# Patient Record
(demographics unavailable — no encounter records)

---

## 2024-10-24 NOTE — PROCEDURE
[Trigger point 1-2 muscle groups] : trigger point 1-2 muscle groups [Left] : of the left [Cervical paraspinal muscle] : cervical paraspinal muscle [Pain] : pain [Alcohol] : alcohol [Betadine] : betadine [Ethyl Chloride sprayed topically] : ethyl chloride sprayed topically [___ cc    1%] : Lidocaine ~Vcc of 1%  [___ cc    0.25%] : Bupivacaine (Marcaine) ~Vcc of 0.25%  [___ cc    10mg] : Triamcinolone (Kenalog) ~Vcc of 10 mg  [Risks, benefits, alternatives discussed / Verbal consent obtained] : the risks benefits, and alternatives have been discussed, and verbal consent was obtained [FreeTextEntry3] : the pain was 8 prior to the incident and 4 after

## 2024-10-24 NOTE — HISTORY OF PRESENT ILLNESS
[9] : 9 [3] : 3 [Burning] : burning [Constant] : constant [Household chores] : household chores [Leisure] : leisure [Work] : work [Sleep] : sleep [Meds] : meds [Heat] : heat [Part time] : Work status: part time [de-identified] : 10/24/24:  82 yo RHD F with severe pain in the back of her neck since yesterday. Pain in the neck and into the left shoulder - not down the arm - NC from the urgent care   Patient was taken to the hospital last week by ambulance because she was off balance but states no injury - was found to have elevated blood pressure - had many test done and they didnt find anything else  works part time in the school  She was [facility] a [facility] in the [facility] last month  Tried tramadol  xrays today: C spine - severe spondylosis   Has memory issues  HTN hld [] : no [FreeTextEntry1] : Neck [de-identified] : turning her head [de-identified] : [facility]

## 2024-10-29 NOTE — PLAN
[FreeTextEntry1] : Routine blood work drawn in office and urine collected today. Renew meds. Will consider restart statin pending blood work results. Advised to make follow up apt with Neurology in Feb 2025. Pt advised to sign up for [facility] portal to review labs and communicate any questions or concerns directly. Yearly physical and return as needed for illness, medication refills, and new or existing complaints.

## 2024-10-29 NOTE — HEALTH RISK ASSESSMENT
[Good] : ~his/her~  mood as  good [No] : In the past 12 months have you used drugs other than those required for medical reasons? No [One fall no injury in past year] : Patient reported one fall in the past year without injury [0] : 2) Feeling down, depressed, or hopeless: Not at all (0) [PHQ-2 Negative - No further assessment needed] : PHQ-2 Negative - No further assessment needed [Never] : Never [NO] : No [Patient reported mammogram was normal] : Patient reported mammogram was normal [Patient reported colonoscopy was normal] : Patient reported colonoscopy was normal [Change in mental status noted] : Change in mental status noted [Learning/Retaining New Information] : difficulty learning/retaining new information [None] : None [With Family] : lives with family [Employed] : employed [] :  [# Of Children ___] : has [unfilled] children [Fully functional (bathing, dressing, toileting, transferring, walking, feeding)] : Fully functional (bathing, dressing, toileting, transferring, walking, feeding) [Fully functional (using the telephone, shopping, preparing meals, housekeeping, doing laundry, using] : Fully functional and needs no help or supervision to perform IADLs (using the telephone, shopping, preparing meals, housekeeping, doing laundry, using transportation, managing medications and managing finances) [Reports normal functional visual acuity (ie: able to read med bottle)] : Reports normal functional visual acuity [Patient/Caregiver not ready to engage] : , patient/caregiver not ready to engage [Audit-CScore] : 0 [JOR1Ruziy] : 0 [Language] : denies difficulty with language [Behavior] : denies difficulty with behavior [Handling Complex Tasks] : denies difficulty handling complex tasks [Reasoning] : denies difficulty with reasoning [Spatial Ability and Orientation] : denies difficulty with spatial ability and orientation [Reports changes in hearing] : Reports no changes in hearing [Reports changes in vision] : Reports no changes in vision [AdvancecareDate] : 10/24

## 2024-11-20 NOTE — HISTORY OF PRESENT ILLNESS
[FreeTextEntry1] : 11/19/2024: SANTIAGO HUTSON is a 83 year-old woman presenting for a NPV for a history of chronic neck pain.    The patient states that average pain over the past week was /10 in severity. Mood: Sleep:   Prior treatment:

## 2024-11-20 NOTE — DISCUSSION/SUMMARY
[de-identified] : SANTIAGO HUTSON is a 83 year-old woman presenting for a NPV for a history of chronic neck pain.

## 2024-12-07 NOTE — IMAGING
[de-identified] : PE neck: +tenderness to B/L cervical paraspinals, no midline tenderness, limited ROM of neck, motor and sensory intact BUE.

## 2024-12-07 NOTE — HISTORY OF PRESENT ILLNESS
[10] : 10 [5] : 5 [Radiating] : radiating [Sleep] : sleep [de-identified] : 85 y/o F with atraumatic neck pain x 2 days. She was given a MDP/TPI about a month ago with Dr. Yee with relief. denies numbness/tingling of UE/LE. denies bladder or bowel complaints.  She didn't go to pain management appt. [] : no [FreeTextEntry1] : neck [FreeTextEntry3] : 12/5/2024 [FreeTextEntry6] : discomfort  [FreeTextEntry5] : no known injury,  [FreeTextEntry7] : from her shoulder up to her neck  [de-identified] : certain movement  [de-identified] : 10/24/2024 [de-identified] : Nakia

## 2024-12-07 NOTE — ASSESSMENT
[FreeTextEntry1] : A/P cervical paraspinal spasm/DDD - medrol dose tony - gabapentin - f/u pain management

## 2024-12-31 NOTE — ASSESSMENT
[FreeTextEntry1] : XR B hand shows CMC arthritis She has signs/symptoms to R hand cellulitis over 3rd MCP region Will rx augmentin  recheck 1 week instructed on signs of worsening infection and need for ER visit if these were to occur all questions answered

## 2024-12-31 NOTE — HISTORY OF PRESENT ILLNESS
[8] : 8 [0] : 0 [Dull/Aching] : dull/aching [Localized] : localized [Intermittent] : intermittent [Retired] : Work status: retired [de-identified] : 12/31/24: 85 y/o patient reports hand pain since 12/29/24. no specific injury/trauma. states she has arthritis in the hand. has redness and swelling. [] : no [FreeTextEntry1] : Bilateral hands [de-identified] : movement

## 2024-12-31 NOTE — IMAGING
[de-identified] : R hand erythema and swelling over 3rd MCP warmth to touch tenderness to 3rd MCP able to make composite fist

## 2025-02-24 NOTE — PHYSICAL EXAM
[Flexion] : flexion [Extension] : extension [Bending to left] : bending to left [Bending to right] : bending to right [] : patient ambulates without assistive device [TWNoteComboBox7] : forward flexion 60 degrees [de-identified] : extension 20 degrees [de-identified] : left lateral bending 10 degrees [de-identified] : left lateral rotation 20 degrees [de-identified] : right lateral bending 10 degrees [TWNoteComboBox6] : right lateral rotation 20 degrees

## 2025-02-24 NOTE — ASSESSMENT
[FreeTextEntry1] : We reviewed the findings and the history. Questions were answered and concerns addressed. The options were outlined. MDP 1 as directed. Tramadol, prn, with ADRs noted. Consider MRI and TFESI.  Patient seen by Tamie JOSUE who determined the assessment and plan and participated in all aspects of the office encounter.

## 2025-02-24 NOTE — REASON FOR VISIT
[FreeTextEntry2] : This is an 84 year old F with left lower back and left leg pain that started without injury on 2/22/25.  No history.  No groin pain.  She is taking Tramadol, which helps.  Her daughter is here.

## 2025-02-24 NOTE — IMAGING
[Facet arthropathy] : Facet arthropathy [Disc space narrowing] : Disc space narrowing [Spondylolithesis] : Spondylolithesis [AP] : anteroposterior [Moderate arthritis (Tonnis Grade 2)] : Moderate arthritis (Tonnis Grade 2)

## 2025-02-24 NOTE — HISTORY OF PRESENT ILLNESS
[Retired] : Work status: retired [de-identified] : 2/24/25 85 y/o F here with left leg pain starting from the lower left side of the back travelling down 2/22/25 - no reported.  [] : no

## 2025-04-11 NOTE — REVIEW OF SYSTEMS
[Joint Pain] : joint pain [Joint Stiffness] : joint stiffness [Joint Swelling] : no joint swelling [Muscle Weakness] : no muscle weakness [Muscle Pain] : muscle pain [Back Pain] : back pain [Negative] : Heme/Lymph

## 2025-04-11 NOTE — HISTORY OF PRESENT ILLNESS
[de-identified] : 84 year old F with PMH HTN and HLD presents for follow up of chronic conditions. Due for blood work. Pt denies CP/SOB, fever/chills, n/v/d/c.

## 2025-04-11 NOTE — PLAN
[FreeTextEntry1] : Routine blood work drawn in office and urine collected today. Renew meds. Pt advised to sign up for [facility] portal to review labs and communicate any questions or concerns directly. Yearly physical and return as needed for illness, medication refills, and new or existing complaints. CPE in 6 months.

## 2025-06-27 NOTE — PHYSICAL EXAM
[Left] : left shoulder [Sitting] : sitting [Severe] : severe [3 ___] : forward flexion 3[unfilled]/5 [] : no sensory deficits [TWNoteComboBox4] : passive forward flexion 90 degrees [de-identified] : external rotation 30 degrees

## 2025-06-27 NOTE — ASSESSMENT
[FreeTextEntry1] : _____ We reviewed the findings and her history. There is an acute issue with uncertain prognosis. There is a chronic component with exacerbation. An MRI is planned to evaluate for an occult fracture. No work is planned. Sling given for comfort. Diclofenac 75mg 1 PO q12h x 7-10 days with GI precautions prescribed. Tramadol 50mg 1 PO q4-6h PRN pain, with ADRs noted, prescribed. She will see Dr. Cast next week.  Patient seen by Tamie JOSUE who determined the assessment and plan and participated in all aspects of the office encounter.
[FreeTextEntry1] : _____ We reviewed the findings and her history. There is an acute issue with uncertain prognosis. There is a chronic component with exacerbation. An MRI is planned to evaluate for an occult fracture. No work is planned. Sling given for comfort. Diclofenac 75mg 1 PO q12h x 7-10 days with GI precautions prescribed. Tramadol 50mg 1 PO q4-6h PRN pain, with ADRs noted, prescribed. She will see Dr. Cast next week.  Patient seen by Tamie JOSUE who determined the assessment and plan and participated in all aspects of the office encounter.
Strong peripheral pulses

## 2025-06-27 NOTE — HISTORY OF PRESENT ILLNESS
[de-identified] : WC 6/26/25 06/27/2025: Ms. 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 Pt reports  [Not working due to injury] : Work status: not working due to injury [Work related] : work related [10] : 10 [] : no [FreeTextEntry1] : L shoulder [FreeTextEntry3] : 6/26/25 [FreeTextEntry5] : Reports she tripped and fell on wood floor landing directly on L shoulder. No prior treatment. Experiencing Swelling and constant pain. [de-identified] : none [de-identified] : [employer]

## 2025-06-27 NOTE — PHYSICAL EXAM
[Left] : left shoulder [Sitting] : sitting [Severe] : severe [3 ___] : forward flexion 3[unfilled]/5 [] : no sensory deficits [TWNoteComboBox4] : passive forward flexion 90 degrees [de-identified] : external rotation 30 degrees

## 2025-06-27 NOTE — WORK
[Sprain/Strain] : sprain/strain [Was the competent medical cause of the injury] : was the competent medical cause of the injury [Are consistent with the injury] : are consistent with the injury [Consistent with my objective findings] : consistent with my objective findings [Torn Ligament/Tendon/Muscle] : torn ligament, tendon or muscle [Total (100%)] : total (100%) [FreeTextEntry1] : She is OOW TD.  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

## 2025-06-27 NOTE — DATA REVIEWED
[FreeTextEntry1] : SHOULDER XR LEFT (AP/Outlet) taken and reviewed on 6/27/25: The clinically significant findings include inferior HH spurring with GH narrowing.  There is AC arthritis.  There is no obvious fracture, including of the glenoid.  There is osteopenia.  SCAPULA XR LEFT (Axillary/Zanca) taken and reviewed on 6/27/25: The clinically significant findings include a Type I-II acromion.

## 2025-06-27 NOTE — REASON FOR VISIT
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  No prior history.  Reaching and lifting are affected.  No numbness.  No treatment before today.  Topical agents have not helped.

## 2025-06-27 NOTE — HISTORY OF PRESENT ILLNESS
[de-identified] : WC 6/26/25 06/27/2025: Ms. 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 Pt reports  [Not working due to injury] : Work status: not working due to injury [Work related] : work related [10] : 10 [] : no [FreeTextEntry1] : L shoulder [FreeTextEntry3] : 6/26/25 [FreeTextEntry5] : Reports she tripped and fell on wood floor landing directly on L shoulder. No prior treatment. Experiencing Swelling and constant pain. [de-identified] : none [de-identified] : [employer]

## 2025-07-19 NOTE — IMAGING
[de-identified] : Neck Exam:  Inspection: No swelling, no ecchymosis Palpation: Trapezial and paracervical tenderness to palpation Range of motion: Diminished range of motion in all planes; pain radiates down arm with neck rotation Strength: 5/5 deltoid, biceps, triceps and wrist flexors/extensors Special testing: Positive spurling, negative molina Reflexes +2, intact motor distally Sensory exam: Altered sensation distally  [Disc space narrowing] : Disc space narrowing [Facet arthropathy] : Facet arthropathy

## 2025-07-19 NOTE — DISCUSSION/SUMMARY
[de-identified] : 85yo female presents with neck pain that happened following a fall at work on 6/26/25 Was seen in [facility] on June 27th because at that time was experiencing left shoulder pain - MRI was ordered left shoulder Since then, neck pain started to develop as a result of a fall  In my professional opinion, the neck should be added to the WC case  Cervical muscle spasm and radiculopathy occurred as a direct result of the fall  X-rays reviewed and diagnosis discussed with patient - severe degenerative changes in the cervical spine  MDP rx sent - r/b/a discussed - instructed how to take medication  Muscle relaxers deferred due to patient's age Discussed use of heat on the neck, hot baths, hot showers Will get MRI Cervical spine to eval for occult fracture due to fall  Patient instructed to rtc within 7 days to see spine specialist after MRI  Follow-up with physician within 7 days clearly communicated with patient and patient verbalized understanding

## 2025-07-19 NOTE — HISTORY OF PRESENT ILLNESS
[Work related] : work related [10] : 10 [6] : 6 [Radiating] : radiating [Sharp] : sharp [Shooting] : shooting [Not working due to injury] : Work status: not working due to injury [] : no [FreeTextEntry1] : Neck  [FreeTextEntry3] : 6/26/2025 [FreeTextEntry5] : Pt states she fell at work and the pain has gotten worse and the pain radiates down her left arm and up her neck  [FreeTextEntry7] : Left arm  [de-identified] : movement  [de-identified] : 6/27/2025 [de-identified] : Marilyn [de-identified] : x-ray [de-identified] : [employer]

## 2025-07-30 NOTE — PROCEDURE
[FreeTextEntry3] : A Large joint injection was performed of the [LEFT SUBACROMIAL SPACE]. The indication for this procedure was pain and inflammation, and patient had decreased mobility in the joint. Risks, benefits and alternatives to a steroid injection procedure were discussed; Risks outlined include but are not limited to infection, sepsis, bleeding, scarring, skin discoloration, temporary increase in pain, syncopal episode, failure to resolve symptoms, allergic reaction, symptom recurrence, and elevation of blood sugar in diabetics.. All questions were answered to the patient's apparent satisfaction and informed consent obtained. Prior to injection a 'Time Out' was conducted in accordance with [employer] policy and the site and nature of procedure verified with the patient.    Procedure: The area of injection was prepared in a sterile fashion. The injection and aspiration was carried out utilizing sterile technique. The site was prepped with alcohol, betadine, ethyl chloride sprayed topically and sterile technique used.   (X) 1cc of Celestone(30mg/ml)  (X) 2cc of 1% Lidocaine   Patient tolerated the procedure well and direct pressure was applied for hemostasis. The patient was reminded of potential post-injection risks including, but not limited to, delayed hypersensitivity reactions and/or infection. Ice tonight to the injection site.

## 2025-07-30 NOTE — DISCUSSION/SUMMARY
[de-identified] : The patient was advised of the diagnosis. The natural history of the pathology was explained in full to the patient in layman's terms. The risks and benefits of surgical and non-surgical treatment alternatives were explained in full to the patient. All questions were answered.

## 2025-07-30 NOTE — HISTORY OF PRESENT ILLNESS
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 NO prior history. Reaching can be painful. She went to [facility] on 06/27/25 and was evaluated by SUMEET Avila. X-rays were taken, Diclofenac and Tramadol were prescribed, and she got MRI done on 07/22/25. Did have relief with MDP.  [] : Post Surgical Visit: no [de-identified] : Teacher

## 2025-07-30 NOTE — DATA REVIEWED
[MRI] : MRI [Left] : left [Shoulder] : shoulder [Report was reviewed and noted in the chart] : The report was reviewed and noted in the chart [I independently reviewed and interpreted images and report] : I independently reviewed and interpreted images and report [FreeTextEntry1] : [facility] MRI LEFT SHOULDER 07/22/25:  1. chronic appearing rotator cuff arthropathy with large, retracted tears of the supraspinatus, infraspinatus, subscapularis and biceps tendon as well as circumferential labral tearing associated with severe GH joint arthrosis with moderate AC joint arthrosis and lateral acromial bone spurs.  2. GH joint effusion, subacromial bursitis and multifocal rotator cuff muscle atrophy with superior displacement of the humeral head and severe narrowing of the supraspinatus outlet.  3. NO acute fracture suspected.

## 2025-07-30 NOTE — ASSESSMENT
[FreeTextEntry1] : 07/30/2025: MRI reviewed and discussed. Based on my independent interpretation of the MRI images there's chronic appearing rotator cuff arthropathy with large, retracted tears of the supraspinatus, infraspinatus, subscapularis and biceps tenon, severe GH joint arthrosis, moderate AC joint arthrosis.  Underlying pathology and treatment options discussed. We discussed the findings of arthritis and the potential treatment options including CSI, visco injections, and PT. She reports she finished MDP a week ago but still c/o on and off pain.  We discussed risk and benefits of CSI. Patient elected to proceed with steroid injection today L SA - tolerated well. Ice if sore. Activity modification as tolerated. Questions addressed. Follow up in 4-6 weeks.  The documentation recorded by the scribe accurately reflects the service I personally performed and the decisions made by me. I, Leandro Epperson, attest that this documentation has been prepared under the direction and in the presence of Provider Ez Chaudhari MD.   The patient was seen by Ez Chaudhari MD.

## 2025-07-30 NOTE — PHYSICAL EXAM
[Left] : left shoulder [Sitting] : sitting [5 ___] : forward flexion 5[unfilled]/5 [5___] : external rotation 5[unfilled]/5 [] : no sensory deficits [TWNoteComboBox7] : active forward flexion 150 degrees [TWNoteComboBox6] : internal rotation L5